# Patient Record
Sex: FEMALE | Race: WHITE | NOT HISPANIC OR LATINO | URBAN - METROPOLITAN AREA
[De-identification: names, ages, dates, MRNs, and addresses within clinical notes are randomized per-mention and may not be internally consistent; named-entity substitution may affect disease eponyms.]

---

## 2022-06-07 ENCOUNTER — OFFICE (OUTPATIENT)
Dept: URBAN - METROPOLITAN AREA CLINIC 81 | Facility: CLINIC | Age: 47
End: 2022-06-07

## 2022-06-07 VITALS
HEART RATE: 93 BPM | DIASTOLIC BLOOD PRESSURE: 80 MMHG | HEIGHT: 60 IN | SYSTOLIC BLOOD PRESSURE: 120 MMHG | WEIGHT: 166 LBS | OXYGEN SATURATION: 99 %

## 2022-06-07 DIAGNOSIS — R19.7 DIARRHEA, UNSPECIFIED: ICD-10-CM

## 2022-06-07 DIAGNOSIS — K21.9 GASTRO-ESOPHAGEAL REFLUX DISEASE WITHOUT ESOPHAGITIS: ICD-10-CM

## 2022-06-07 DIAGNOSIS — R19.4 CHANGE IN BOWEL HABIT: ICD-10-CM

## 2022-06-07 DIAGNOSIS — R63.4 ABNORMAL WEIGHT LOSS: ICD-10-CM

## 2022-06-07 DIAGNOSIS — R11.0 NAUSEA: ICD-10-CM

## 2022-06-07 DIAGNOSIS — K59.09 OTHER CONSTIPATION: ICD-10-CM

## 2022-06-07 DIAGNOSIS — R10.30 LOWER ABDOMINAL PAIN, UNSPECIFIED: ICD-10-CM

## 2022-06-07 PROCEDURE — 99204 OFFICE O/P NEW MOD 45 MIN: CPT | Performed by: NURSE PRACTITIONER

## 2022-06-17 ENCOUNTER — AMBULATORY SURGICAL CENTER (OUTPATIENT)
Dept: URBAN - METROPOLITAN AREA SURGERY 18 | Facility: SURGERY | Age: 47
End: 2022-06-17
Payer: COMMERCIAL

## 2022-06-17 ENCOUNTER — OFFICE (OUTPATIENT)
Dept: URBAN - METROPOLITAN AREA PATHOLOGY 24 | Facility: PATHOLOGY | Age: 47
End: 2022-06-17
Payer: COMMERCIAL

## 2022-06-17 DIAGNOSIS — K31.89 OTHER DISEASES OF STOMACH AND DUODENUM: ICD-10-CM

## 2022-06-17 DIAGNOSIS — K29.50 UNSPECIFIED CHRONIC GASTRITIS WITHOUT BLEEDING: ICD-10-CM

## 2022-06-17 DIAGNOSIS — R11.0 NAUSEA: ICD-10-CM

## 2022-06-17 PROCEDURE — 43239 EGD BIOPSY SINGLE/MULTIPLE: CPT | Performed by: SPECIALIST

## 2022-06-17 PROCEDURE — 88305 TISSUE EXAM BY PATHOLOGIST: CPT | Performed by: STUDENT IN AN ORGANIZED HEALTH CARE EDUCATION/TRAINING PROGRAM

## 2022-06-17 PROCEDURE — 88342 IMHCHEM/IMCYTCHM 1ST ANTB: CPT | Mod: 59 | Performed by: STUDENT IN AN ORGANIZED HEALTH CARE EDUCATION/TRAINING PROGRAM

## 2022-06-17 PROCEDURE — 88313 SPECIAL STAINS GROUP 2: CPT | Performed by: STUDENT IN AN ORGANIZED HEALTH CARE EDUCATION/TRAINING PROGRAM

## 2022-09-12 ENCOUNTER — OFFICE (OUTPATIENT)
Dept: URBAN - METROPOLITAN AREA CLINIC 81 | Facility: CLINIC | Age: 47
End: 2022-09-12
Payer: COMMERCIAL

## 2022-09-12 VITALS
WEIGHT: 178 LBS | DIASTOLIC BLOOD PRESSURE: 82 MMHG | OXYGEN SATURATION: 99 % | SYSTOLIC BLOOD PRESSURE: 128 MMHG | HEART RATE: 77 BPM | HEIGHT: 60 IN

## 2022-09-12 DIAGNOSIS — R10.30 LOWER ABDOMINAL PAIN, UNSPECIFIED: ICD-10-CM

## 2022-09-12 DIAGNOSIS — K21.9 GASTRO-ESOPHAGEAL REFLUX DISEASE WITHOUT ESOPHAGITIS: ICD-10-CM

## 2022-09-12 DIAGNOSIS — K59.09 OTHER CONSTIPATION: ICD-10-CM

## 2022-09-12 DIAGNOSIS — R11.0 NAUSEA: ICD-10-CM

## 2022-09-12 DIAGNOSIS — R19.4 CHANGE IN BOWEL HABIT: ICD-10-CM

## 2022-09-12 DIAGNOSIS — R19.7 DIARRHEA, UNSPECIFIED: ICD-10-CM

## 2022-09-12 PROCEDURE — 99214 OFFICE O/P EST MOD 30 MIN: CPT

## 2022-09-12 RX ORDER — SODIUM SULFATE, MAGNESIUM SULFATE, AND POTASSIUM CHLORIDE 17.75; 2.7; 2.25 G/1; G/1; G/1
TABLET ORAL
Qty: 1 | Refills: 0 | Status: COMPLETED
Start: 2022-09-12 | End: 2022-10-25

## 2022-09-12 NOTE — SERVICEHPINOTES
Janie Kwko   is seen today for a follow-up visit. She is due to have a repeat EGD due to esophagitis. States she has has been taking PPI, but has not been taking the Carafate as prescribed. She states it cause her nausea to be worse. She was prescribed phenergan by her PCP but is out of it. She is requesting a refill at this time. She also reports some intermittent tenderness in lower abdomen. She denies any weight loss. No recent imaging. She has chronic reflux. br
br She states she has chronic constipation but since taking a probiotic daily it has helped with her constipation. She has a BM every day. No blood. She will sometimes have chronic constipation, followed by diarrhea. She states this happens about once a month or so. States she has been taking probiotic for 6-9mo. 
br
br She sees Dr. Couch. Last seen a year ago. No hx of heart attack/stents.etc.  br brEGD in 2022
br Grade C esophagitis non-bleeding gastric ulcers
br Congestive gastropathy, avoid NSAIDS, PPI BID/Carafate
br Repeat in 3mo for retreatment 
br

## 2022-10-14 ENCOUNTER — AMBULATORY SURGICAL CENTER (OUTPATIENT)
Dept: URBAN - METROPOLITAN AREA SURGERY 18 | Facility: SURGERY | Age: 47
End: 2022-10-14
Payer: COMMERCIAL

## 2022-10-14 ENCOUNTER — OFFICE (OUTPATIENT)
Dept: URBAN - METROPOLITAN AREA PATHOLOGY 24 | Facility: PATHOLOGY | Age: 47
End: 2022-10-14
Payer: COMMERCIAL

## 2022-10-14 DIAGNOSIS — R11.2 NAUSEA WITH VOMITING, UNSPECIFIED: ICD-10-CM

## 2022-10-14 DIAGNOSIS — R10.13 EPIGASTRIC PAIN: ICD-10-CM

## 2022-10-14 DIAGNOSIS — T18.2XXA FOREIGN BODY IN STOMACH, INITIAL ENCOUNTER: ICD-10-CM

## 2022-10-14 DIAGNOSIS — K25.7 CHRONIC GASTRIC ULCER WITHOUT HEMORRHAGE OR PERFORATION: ICD-10-CM

## 2022-10-14 DIAGNOSIS — K31.89 OTHER DISEASES OF STOMACH AND DUODENUM: ICD-10-CM

## 2022-10-14 DIAGNOSIS — K29.50 UNSPECIFIED CHRONIC GASTRITIS WITHOUT BLEEDING: ICD-10-CM

## 2022-10-14 DIAGNOSIS — K21.00 GASTRO-ESOPHAGEAL REFLUX DISEASE WITH ESOPHAGITIS, WITHOUT B: ICD-10-CM

## 2022-10-14 DIAGNOSIS — Z87.11 PERSONAL HISTORY OF PEPTIC ULCER DISEASE: ICD-10-CM

## 2022-10-14 PROCEDURE — 88342 IMHCHEM/IMCYTCHM 1ST ANTB: CPT

## 2022-10-14 PROCEDURE — 88313 SPECIAL STAINS GROUP 2: CPT

## 2022-10-14 PROCEDURE — 88305 TISSUE EXAM BY PATHOLOGIST: CPT

## 2022-10-14 PROCEDURE — 43239 EGD BIOPSY SINGLE/MULTIPLE: CPT | Performed by: SPECIALIST

## 2022-10-25 ENCOUNTER — OFFICE (OUTPATIENT)
Dept: URBAN - METROPOLITAN AREA CLINIC 81 | Facility: CLINIC | Age: 47
End: 2022-10-25
Payer: COMMERCIAL

## 2022-10-25 VITALS
OXYGEN SATURATION: 98 % | WEIGHT: 175 LBS | HEART RATE: 95 BPM | HEIGHT: 60 IN | DIASTOLIC BLOOD PRESSURE: 82 MMHG | SYSTOLIC BLOOD PRESSURE: 132 MMHG

## 2022-10-25 DIAGNOSIS — R93.5 ABNORMAL FINDINGS ON DIAGNOSTIC IMAGING OF OTHER ABDOMINAL R: ICD-10-CM

## 2022-10-25 DIAGNOSIS — R63.4 ABNORMAL WEIGHT LOSS: ICD-10-CM

## 2022-10-25 DIAGNOSIS — K21.9 GASTRO-ESOPHAGEAL REFLUX DISEASE WITHOUT ESOPHAGITIS: ICD-10-CM

## 2022-10-25 DIAGNOSIS — K25.9 GASTRIC ULCER, UNSPECIFIED AS ACUTE OR CHRONIC, WITHOUT HEMO: ICD-10-CM

## 2022-10-25 DIAGNOSIS — R10.30 LOWER ABDOMINAL PAIN, UNSPECIFIED: ICD-10-CM

## 2022-10-25 DIAGNOSIS — K59.09 OTHER CONSTIPATION: ICD-10-CM

## 2022-10-25 DIAGNOSIS — K31.84 GASTROPARESIS: ICD-10-CM

## 2022-10-25 PROCEDURE — 99214 OFFICE O/P EST MOD 30 MIN: CPT | Performed by: NURSE PRACTITIONER

## 2022-10-25 RX ORDER — SODIUM SULFATE, MAGNESIUM SULFATE, AND POTASSIUM CHLORIDE 17.75; 2.7; 2.25 G/1; G/1; G/1
TABLET ORAL
Qty: 1 | Refills: 0 | Status: COMPLETED
Start: 2022-10-25 | End: 2023-08-04

## 2023-08-04 ENCOUNTER — OFFICE (OUTPATIENT)
Dept: URBAN - METROPOLITAN AREA CLINIC 81 | Facility: CLINIC | Age: 48
End: 2023-08-04

## 2023-08-04 VITALS
SYSTOLIC BLOOD PRESSURE: 101 MMHG | WEIGHT: 156 LBS | OXYGEN SATURATION: 98 % | HEART RATE: 75 BPM | DIASTOLIC BLOOD PRESSURE: 68 MMHG | HEIGHT: 60 IN

## 2023-08-04 DIAGNOSIS — K21.9 GASTRO-ESOPHAGEAL REFLUX DISEASE WITHOUT ESOPHAGITIS: ICD-10-CM

## 2023-08-04 DIAGNOSIS — R13.14 DYSPHAGIA, PHARYNGOESOPHAGEAL PHASE: ICD-10-CM

## 2023-08-04 DIAGNOSIS — R19.7 DIARRHEA, UNSPECIFIED: ICD-10-CM

## 2023-08-04 DIAGNOSIS — K31.84 GASTROPARESIS: ICD-10-CM

## 2023-08-04 DIAGNOSIS — K59.09 OTHER CONSTIPATION: ICD-10-CM

## 2023-08-04 DIAGNOSIS — R63.4 ABNORMAL WEIGHT LOSS: ICD-10-CM

## 2023-08-04 DIAGNOSIS — K25.9 GASTRIC ULCER, UNSPECIFIED AS ACUTE OR CHRONIC, WITHOUT HEMO: ICD-10-CM

## 2023-08-04 PROCEDURE — 99214 OFFICE O/P EST MOD 30 MIN: CPT | Performed by: NURSE PRACTITIONER

## 2023-08-04 RX ORDER — FAMOTIDINE 40 MG/1
40 TABLET, FILM COATED ORAL
Qty: 90 | Refills: 3 | Status: ACTIVE
Start: 2023-08-04

## 2023-08-04 RX ORDER — PANTOPRAZOLE SODIUM 40 MG/1
40 TABLET, DELAYED RELEASE ORAL
Qty: 90 | Refills: 3 | Status: ACTIVE
Start: 2023-08-04

## 2023-08-08 LAB
CBC WITH DIFFERENTIAL/PLATELET: BASO (ABSOLUTE): 0 X10E3/UL (ref 0–0.2)
CBC WITH DIFFERENTIAL/PLATELET: BASOS: 0 %
CBC WITH DIFFERENTIAL/PLATELET: EOS (ABSOLUTE): 0.1 X10E3/UL (ref 0–0.4)
CBC WITH DIFFERENTIAL/PLATELET: EOS: 2 %
CBC WITH DIFFERENTIAL/PLATELET: HEMATOCRIT: 34.2 % (ref 34–46.6)
CBC WITH DIFFERENTIAL/PLATELET: HEMATOLOGY COMMENTS: (no result)
CBC WITH DIFFERENTIAL/PLATELET: HEMOGLOBIN: 10.8 G/DL — LOW (ref 11.1–15.9)
CBC WITH DIFFERENTIAL/PLATELET: IMMATURE CELLS: (no result)
CBC WITH DIFFERENTIAL/PLATELET: IMMATURE GRANS (ABS): 0 X10E3/UL (ref 0–0.1)
CBC WITH DIFFERENTIAL/PLATELET: IMMATURE GRANULOCYTES: 1 %
CBC WITH DIFFERENTIAL/PLATELET: LYMPHS (ABSOLUTE): 1.2 X10E3/UL (ref 0.7–3.1)
CBC WITH DIFFERENTIAL/PLATELET: LYMPHS: 28 %
CBC WITH DIFFERENTIAL/PLATELET: MCH: 28.6 PG (ref 26.6–33)
CBC WITH DIFFERENTIAL/PLATELET: MCHC: 31.6 G/DL (ref 31.5–35.7)
CBC WITH DIFFERENTIAL/PLATELET: MCV: 91 FL (ref 79–97)
CBC WITH DIFFERENTIAL/PLATELET: MONOCYTES(ABSOLUTE): 0.3 X10E3/UL (ref 0.1–0.9)
CBC WITH DIFFERENTIAL/PLATELET: MONOCYTES: 8 %
CBC WITH DIFFERENTIAL/PLATELET: NEUTROPHILS (ABSOLUTE): 2.7 X10E3/UL (ref 1.4–7)
CBC WITH DIFFERENTIAL/PLATELET: NEUTROPHILS: 61 %
CBC WITH DIFFERENTIAL/PLATELET: NRBC: (no result)
CBC WITH DIFFERENTIAL/PLATELET: PLATELETS: 464 X10E3/UL — HIGH (ref 150–450)
CBC WITH DIFFERENTIAL/PLATELET: RBC: 3.77 X10E6/UL (ref 3.77–5.28)
CBC WITH DIFFERENTIAL/PLATELET: RDW: 18.1 % — HIGH (ref 11.7–15.4)
CBC WITH DIFFERENTIAL/PLATELET: WBC: 4.4 X10E3/UL (ref 3.4–10.8)
COMP. METABOLIC PANEL (14): A/G RATIO: 1.9 (ref 1.2–2.2)
COMP. METABOLIC PANEL (14): ALBUMIN: 5.2 G/DL — HIGH (ref 3.9–4.9)
COMP. METABOLIC PANEL (14): ALKALINE PHOSPHATASE: 116 IU/L (ref 44–121)
COMP. METABOLIC PANEL (14): ALT (SGPT): 12 IU/L (ref 0–32)
COMP. METABOLIC PANEL (14): AST (SGOT): 18 IU/L (ref 0–40)
COMP. METABOLIC PANEL (14): BILIRUBIN, TOTAL: 0.2 MG/DL (ref 0–1.2)
COMP. METABOLIC PANEL (14): BUN/CREATININE RATIO: 11 (ref 9–23)
COMP. METABOLIC PANEL (14): BUN: 9 MG/DL (ref 6–24)
COMP. METABOLIC PANEL (14): CALCIUM: 9 MG/DL (ref 8.7–10.2)
COMP. METABOLIC PANEL (14): CARBON DIOXIDE, TOTAL: 15 MMOL/L — LOW (ref 20–29)
COMP. METABOLIC PANEL (14): CHLORIDE: 109 MMOL/L — HIGH (ref 96–106)
COMP. METABOLIC PANEL (14): CREATININE: 0.79 MG/DL (ref 0.57–1)
COMP. METABOLIC PANEL (14): EGFR: 92 ML/MIN/1.73 (ref 59–?)
COMP. METABOLIC PANEL (14): GLOBULIN, TOTAL: 2.7 G/DL (ref 1.5–4.5)
COMP. METABOLIC PANEL (14): GLUCOSE: 78 MG/DL (ref 70–99)
COMP. METABOLIC PANEL (14): POTASSIUM: 3.8 MMOL/L (ref 3.5–5.2)
COMP. METABOLIC PANEL (14): PROTEIN, TOTAL: 7.9 G/DL (ref 6–8.5)
COMP. METABOLIC PANEL (14): SODIUM: 141 MMOL/L (ref 134–144)
GASTRIN, SERUM: 31 PG/ML (ref 0–115)
TSH: 1.9 UIU/ML (ref 0.45–4.5)

## 2023-08-11 ENCOUNTER — OFFICE (OUTPATIENT)
Dept: URBAN - METROPOLITAN AREA PATHOLOGY 13 | Facility: PATHOLOGY | Age: 48
End: 2023-08-11
Payer: MEDICARE

## 2023-08-11 ENCOUNTER — AMBULATORY SURGICAL CENTER (OUTPATIENT)
Dept: URBAN - METROPOLITAN AREA SURGERY 18 | Facility: SURGERY | Age: 48
End: 2023-08-11
Payer: MEDICARE

## 2023-08-11 DIAGNOSIS — K31.89 OTHER DISEASES OF STOMACH AND DUODENUM: ICD-10-CM

## 2023-08-11 PROCEDURE — 43239 EGD BIOPSY SINGLE/MULTIPLE: CPT | Performed by: SPECIALIST

## 2023-08-11 PROCEDURE — 88305 TISSUE EXAM BY PATHOLOGIST: CPT | Performed by: PATHOLOGY

## 2023-08-11 PROCEDURE — 88342 IMHCHEM/IMCYTCHM 1ST ANTB: CPT | Performed by: PATHOLOGY

## 2024-03-27 ENCOUNTER — OFFICE (OUTPATIENT)
Dept: URBAN - METROPOLITAN AREA CLINIC 81 | Facility: CLINIC | Age: 49
End: 2024-03-27

## 2024-03-27 VITALS
SYSTOLIC BLOOD PRESSURE: 115 MMHG | HEART RATE: 76 BPM | DIASTOLIC BLOOD PRESSURE: 75 MMHG | HEIGHT: 60 IN | WEIGHT: 153 LBS | OXYGEN SATURATION: 99 %

## 2024-03-27 DIAGNOSIS — R11.0 NAUSEA: ICD-10-CM

## 2024-03-27 DIAGNOSIS — K59.09 OTHER CONSTIPATION: ICD-10-CM

## 2024-03-27 DIAGNOSIS — R13.14 DYSPHAGIA, PHARYNGOESOPHAGEAL PHASE: ICD-10-CM

## 2024-03-27 DIAGNOSIS — R63.4 ABNORMAL WEIGHT LOSS: ICD-10-CM

## 2024-03-27 DIAGNOSIS — K21.9 GASTRO-ESOPHAGEAL REFLUX DISEASE WITHOUT ESOPHAGITIS: ICD-10-CM

## 2024-03-27 DIAGNOSIS — R19.7 DIARRHEA, UNSPECIFIED: ICD-10-CM

## 2024-03-27 DIAGNOSIS — K25.9 GASTRIC ULCER, UNSPECIFIED AS ACUTE OR CHRONIC, WITHOUT HEMO: ICD-10-CM

## 2024-03-27 DIAGNOSIS — K31.84 GASTROPARESIS: ICD-10-CM

## 2024-03-27 PROCEDURE — 99214 OFFICE O/P EST MOD 30 MIN: CPT | Performed by: NURSE PRACTITIONER

## 2024-05-07 ENCOUNTER — OFF CAMPUS - OUTPATIENT (OUTPATIENT)
Dept: URBAN - METROPOLITAN AREA HOSPITAL 88 | Facility: HOSPITAL | Age: 49
End: 2024-05-07
Payer: MEDICARE

## 2024-05-07 DIAGNOSIS — K31.89 OTHER DISEASES OF STOMACH AND DUODENUM: ICD-10-CM

## 2024-05-07 DIAGNOSIS — K21.00 GASTRO-ESOPHAGEAL REFLUX DISEASE WITH ESOPHAGITIS, WITHOUT B: ICD-10-CM

## 2024-05-07 PROCEDURE — 43239 EGD BIOPSY SINGLE/MULTIPLE: CPT | Performed by: SPECIALIST
